# Patient Record
Sex: FEMALE | Race: WHITE | Employment: UNEMPLOYED | URBAN - METROPOLITAN AREA
[De-identification: names, ages, dates, MRNs, and addresses within clinical notes are randomized per-mention and may not be internally consistent; named-entity substitution may affect disease eponyms.]

---

## 2017-03-06 ENCOUNTER — PATIENT MESSAGE (OUTPATIENT)
Dept: INTERNAL MEDICINE CLINIC | Facility: CLINIC | Age: 62
End: 2017-03-06

## 2017-03-09 NOTE — TELEPHONE ENCOUNTER
[address] [facility] in [place], they indicated that they do see the prescriptions for the levothyroxine and the pravastatin. They have been filled and ready for patient to pickup for the past 2 days. Patient advised.

## 2017-03-09 NOTE — TELEPHONE ENCOUNTER
From: Jose Agarwal  To: Neil Quinones MD  Sent: 3/6/2017 8:53 PM CST  Subject: Prescription Question    Please transfer my refills for my cholesterol and thyroxin to [facility] in [place] [address] . Please let me know when this is done.  I am in need of re

## 2017-08-14 ENCOUNTER — TELEPHONE (OUTPATIENT)
Dept: INTERNAL MEDICINE CLINIC | Facility: CLINIC | Age: 62
End: 2017-08-14

## 2017-08-31 ENCOUNTER — TELEPHONE (OUTPATIENT)
Dept: INTERNAL MEDICINE CLINIC | Facility: CLINIC | Age: 62
End: 2017-08-31

## 2017-08-31 DIAGNOSIS — Z12.39 SCREENING FOR BREAST CANCER: Primary | ICD-10-CM

## 2017-09-11 ENCOUNTER — PATIENT MESSAGE (OUTPATIENT)
Dept: INTERNAL MEDICINE CLINIC | Facility: CLINIC | Age: 62
End: 2017-09-11

## 2017-09-12 ENCOUNTER — TELEPHONE (OUTPATIENT)
Dept: INTERNAL MEDICINE CLINIC | Facility: CLINIC | Age: 62
End: 2017-09-12

## 2017-09-12 NOTE — TELEPHONE ENCOUNTER
Please order v the tests she asks for in her e-mail to me. I don't know why I have to see all these emails and they aren't just pended to begin with.

## 2017-09-13 NOTE — TELEPHONE ENCOUNTER
From:  Dear  To: Selina Thomas MD  Sent: 9/11/2017 9:23 PM CDT  Subject: Prescription Question    I will be coming in to [place] Sept 13th and would like to have my blood work done while I am there.  Dr. Jose Thakur needs thyroid panel, chol

## 2017-09-14 ENCOUNTER — APPOINTMENT (OUTPATIENT)
Dept: LAB | Facility: HOSPITAL | Age: 62
End: 2017-09-14
Attending: INTERNAL MEDICINE
Payer: COMMERCIAL

## 2017-09-14 ENCOUNTER — HOSPITAL ENCOUNTER (OUTPATIENT)
Dept: MAMMOGRAPHY | Age: 62
Discharge: HOME | End: 2017-09-14
Attending: INTERNAL MEDICINE
Payer: COMMERCIAL

## 2017-09-14 DIAGNOSIS — E78.2 MIXED HYPERLIPIDEMIA: ICD-10-CM

## 2017-09-14 DIAGNOSIS — Z00.00 WELL ADULT EXAM: ICD-10-CM

## 2017-09-14 DIAGNOSIS — Z12.39 SCREENING FOR BREAST CANCER: ICD-10-CM

## 2017-09-14 DIAGNOSIS — E03.9 ACQUIRED HYPOTHYROIDISM: ICD-10-CM

## 2017-09-14 LAB
ALBUMIN SERPL BCP-MCNC: 3.9 G/DL (ref 3.5–4.8)
ALBUMIN/GLOB SERPL: 1.4 {RATIO} (ref 1–2)
ALP SERPL-CCNC: 52 U/L (ref 32–100)
ALT SERPL-CCNC: 16 U/L (ref 14–54)
ANION GAP SERPL CALC-SCNC: 7 MMOL/L (ref 0–18)
AST SERPL-CCNC: 17 U/L (ref 15–41)
BILIRUB SERPL-MCNC: 0.8 MG/DL (ref 0.3–1.2)
BUN SERPL-MCNC: 13 MG/DL (ref 8–20)
BUN/CREAT SERPL: 15.5 (ref 10–20)
CALCIUM SERPL-MCNC: 9.1 MG/DL (ref 8.5–10.5)
CHLORIDE SERPL-SCNC: 104 MMOL/L (ref 95–110)
CHOLEST SERPL-MCNC: 178 MG/DL (ref 110–200)
CO2 SERPL-SCNC: 29 MMOL/L (ref 22–32)
CREAT SERPL-MCNC: 0.84 MG/DL (ref 0.5–1.5)
GLOBULIN PLAS-MCNC: 2.7 G/DL (ref 2.5–3.7)
GLUCOSE SERPL-MCNC: 100 MG/DL (ref 70–99)
HCV AB SERPL QL IA: NONREACTIVE
HDLC SERPL-MCNC: 51 MG/DL
LDLC SERPL CALC-MCNC: 104 MG/DL (ref 0–99)
NONHDLC SERPL-MCNC: 127 MG/DL
OSMOLALITY UR CALC.SUM OF ELEC: 290 MOSM/KG (ref 275–295)
POTASSIUM SERPL-SCNC: 4.1 MMOL/L (ref 3.3–5.1)
PROT SERPL-MCNC: 6.6 G/DL (ref 5.9–8.4)
SODIUM SERPL-SCNC: 140 MMOL/L (ref 136–144)
T3FREE SERPL-MCNC: 2.86 PG/ML (ref 2.53–4.29)
T4 SERPL-MCNC: 8.2 MCG/DL (ref 6.1–12.2)
TRIGL SERPL-MCNC: 115 MG/DL (ref 1–149)
TSH SERPL-ACNC: 5.54 UIU/ML (ref 0.45–5.33)

## 2017-09-14 PROCEDURE — 84436 ASSAY OF TOTAL THYROXINE: CPT

## 2017-09-14 PROCEDURE — 84443 ASSAY THYROID STIM HORMONE: CPT

## 2017-09-14 PROCEDURE — 84481 FREE ASSAY (FT-3): CPT

## 2017-09-14 PROCEDURE — 80061 LIPID PANEL: CPT

## 2017-09-14 PROCEDURE — 86803 HEPATITIS C AB TEST: CPT

## 2017-09-14 PROCEDURE — 80053 COMPREHEN METABOLIC PANEL: CPT

## 2017-09-14 PROCEDURE — 36415 COLL VENOUS BLD VENIPUNCTURE: CPT

## 2017-09-14 PROCEDURE — 77067 SCR MAMMO BI INCL CAD: CPT | Performed by: INTERNAL MEDICINE

## 2019-01-30 ENCOUNTER — PATIENT MESSAGE (OUTPATIENT)
Dept: INTERNAL MEDICINE CLINIC | Facility: CLINIC | Age: 64
End: 2019-01-30

## 2019-01-31 NOTE — TELEPHONE ENCOUNTER
From: Burke Yee  To: Nataliya Pruitt MD  Sent: 1/30/2019 3:24 PM CST  Subject: Non-Urgent Medical Question    I have moved to [place] and am pursuing health care there.  My doctor is interested in getting a copy of the carotid ultrasound I had per

## 2020-05-02 NOTE — TELEPHONE ENCOUNTER
Needs cbc,comprehensive panel,U/A,TSH and fasting lipids under physical exam code.
Ok to provide labs prior to physical apt on 10/3?   Last labs completed on 9/20/16
Pt scheduled a px appt with Dr. Asad Calix for 10/3, pt asking for all her labs to please be ordered and entered onto her chart prior to her visit.
[id number] Comprehensive

## 2021-03-15 DIAGNOSIS — Z23 NEED FOR VACCINATION: ICD-10-CM
